# Patient Record
Sex: MALE | Race: WHITE | NOT HISPANIC OR LATINO | Employment: FULL TIME | URBAN - METROPOLITAN AREA
[De-identification: names, ages, dates, MRNs, and addresses within clinical notes are randomized per-mention and may not be internally consistent; named-entity substitution may affect disease eponyms.]

---

## 2018-01-11 ENCOUNTER — ALLSCRIPTS OFFICE VISIT (OUTPATIENT)
Dept: OTHER | Facility: OTHER | Age: 33
End: 2018-01-11

## 2018-01-11 DIAGNOSIS — M79.609 PAIN IN EXTREMITY: ICD-10-CM

## 2018-01-11 DIAGNOSIS — R20.2 PARESTHESIA OF SKIN: ICD-10-CM

## 2018-01-12 NOTE — PROGRESS NOTES
Assessment   1  Paresthesia and pain of left extremity (729 5,782 0) (M79 609,R20 2)   2  Paresthesia of left lower extremity (782 0) (R20 2)    Plan   Paresthesia and pain of left extremity, Paresthesia of left lower extremity    · * MRI BRAIN WO CONTRAST; Status:Need Information - Financial Authorization; Requested [id number];    · * MRI CERVICAL SPINE WO CONTRAST; Status:Need Information - Financial    Authorization; Requested [id number];    · * XR SPINE CERVICAL COMPLETE 4 OR 5 VW NON INJURY; Status:Active; Requested    AJP:[id number]; Discussion/Summary      DISCUSSED FINDINGS WITH LAURA SINCE SYMPTOMS ARE BECOMING MORE FREQ, FURTHER W/U INCLUDING MRI AND POSSIBLE BW SHOULD BE CONSIDERED AT THIS TIME  The treatment plan was reviewed with the patient/guardian  The patient/guardian understands and agrees with the treatment plan      Chief Complaint   Patient is here today for left arm and left leg pain that happened 2 days ago, and started again yesterday for 2 5 hours, L Cpx/LPN      History of Present Illness   HPI: PATIENT C/O SHARP PAIN IN ARM AND LEG ON THE L SIDE SL TINGLING LASTS FOR SEVERAL SECONDS SEVERAL YEARS AGO HAS OCCURRED MULTIPLE TIMES RECENTLY   ANY HEADACHE, NUMBNESS NOT HAD ANY TRAUMA ANY RECENT FEVER, CHILLS, RASHES OR TICK BITES WELL MUSCLE WEAKNESS DETECTED          Review of Systems        Constitutional: no fever,-- no chills-- and-- not feeling tired  ENT: no sore throat-- and-- no nasal discharge  Cardiovascular: no chest pain,-- the heart rate was not fast,-- no palpitations-- and-- no lower extremity edema  Respiratory: no cough,-- no wheezing-- and-- no shortness of breath during exertion  Gastrointestinal: no abdominal pain,-- no nausea,-- no vomiting-- and-- no diarrhea  Musculoskeletal: no arthralgias  Integumentary: no itching  Neurological: tingling, but-- no headache  ROS reviewed  Past Medical History   1   History of Achilles tendinitis of right lower extremity (726 71) (M76 61)   2  History of Displacement of lumbar intervertebral disc without myelopathy (722 10)     (M51 26)   3  History of hemorrhoids (V13 89) (Z87 19)  Active Problems And Past Medical History Reviewed: The active problems and past medical history were reviewed and updated today  Family History   Mother    1  Family history of Hypertension  Father    2  Family history of Hypertension  Other    3  Family history of Anxiety  Family History    4  Family history of Anxiety   5  Denied: Family history of substance abuse  Family History Reviewed: The family history was reviewed and updated today  Social History    · Caffeine use (V49 89) (F15 90)   · Dental care, regularly   · Drinks coffee   · Never a smoker   · Occasional alcohol use  The social history was reviewed and updated today  Surgical History   1  History of Dental Surgery  Surgical History Reviewed: The surgical history was reviewed and updated today  Current Meds      The medication list was reviewed and updated today  Allergies   1  Penicillins    Vitals    Recorded: [id number] 11:06AM   Temperature 98 3 F, Temporal   Heart Rate 82, R Radial   Pulse Quality Normal, R Radial   Respiration Quality Normal   Respiration 16   Systolic 935, RUE, Sitting   Diastolic 80, RUE, Sitting   Height 5 ft 8 in   Weight 169 lb    BMI Calculated 25 7   BSA Calculated 1 9   O2 Saturation 98     Physical Exam        Constitutional      General appearance: No acute distress, well appearing and well nourished  Eyes      Conjunctiva and lids: No swelling, erythema, or discharge  Pupils and irises: Equal, round and reactive to light  Ears, Nose, Mouth, and Throat      External inspection of ears and nose: Normal        Oropharynx: Normal with no erythema, edema, exudate or lesions         Pulmonary      Respiratory effort: No increased work of breathing or signs of respiratory distress  Auscultation of lungs: Clear to auscultation, equal breath sounds bilaterally, no wheezes, no rales, no rhonci  Cardiovascular      Auscultation of heart: Normal rate and rhythm, normal S1 and S2, without murmurs  Examination of extremities for edema and/or varicosities: Normal        Abdomen      Abdomen: Non-tender, no masses  Liver and spleen: No hepatomegaly or splenomegaly  Lymphatic      Palpation of lymph nodes in neck: No lymphadenopathy  Musculoskeletal      Gait and station: Normal        Digits and nails: Normal without clubbing or cyanosis  Skin      Skin and subcutaneous tissue: Normal without rashes or lesions  Neurologic      Cranial nerves: Cranial nerves 2-12 intact  Reflexes: 2+ and symmetric  Sensation: No sensory loss         Psychiatric      Orientation to person, place and time: Normal        Mood and affect: Normal           Signatures    Electronically signed by : Renzo Blair MD; Jan 11 2018  2:22PM EST                       (Author)

## 2018-01-18 NOTE — PROGRESS NOTES
Chief Complaint  Patient is here today for an Adacel Vaccine  Given to left deltoid I M  lb/lpn      Active Problems   1  Achilles tendinitis of right lower extremity (396 71) (M76 61)  2  Hemorrhoids, unspecified hemorrhoid type (455 6) (K64 9)    Current Meds  1  PredniSONE 20 MG Oral Tablet; TAKE 2 TABLET Daily X 4 DAYS, THEN 1 TAB QD X 4   DAYS, THEN 1/2  TAB PO QD X 4 DAYS; Therapy: [id number] to (Bethany Mcdonough)  Requested for: [id number]; Last   Rx:[id number] Ordered    Allergies   1  Penicillins    Plan  Achilles tendinitis of right lower extremity    · Orthopedic Surgery Referral Other Physician Referral  Consult  Status: Active  Requested  for: [id number]  YOU are Referring to a non- Preferred Provider : Established Patient  (MU) Care Summary provided  : Yes  Need for Tdap vaccination    · Administered:  Tdap (Adacel)    Signatures   Electronically signed by : Renzo Blair MD; May 29 2016 10:18PM EST                       (Author)

## 2018-01-23 VITALS
RESPIRATION RATE: 16 BRPM | SYSTOLIC BLOOD PRESSURE: 122 MMHG | HEART RATE: 82 BPM | OXYGEN SATURATION: 98 % | DIASTOLIC BLOOD PRESSURE: 80 MMHG | BODY MASS INDEX: 25.61 KG/M2 | WEIGHT: 169 LBS | TEMPERATURE: 98.3 F | HEIGHT: 68 IN

## 2018-04-16 ENCOUNTER — OFFICE VISIT (OUTPATIENT)
Dept: FAMILY MEDICINE CLINIC | Facility: CLINIC | Age: 33
End: 2018-04-16
Payer: COMMERCIAL

## 2018-04-16 VITALS — BODY MASS INDEX: 27.15 KG/M2 | TEMPERATURE: 98.5 F | HEIGHT: 67 IN | WEIGHT: 173 LBS

## 2018-04-16 DIAGNOSIS — K52.9 GASTROENTERITIS: Primary | ICD-10-CM

## 2018-04-16 DIAGNOSIS — B34.9 VIRAL INFECTION: ICD-10-CM

## 2018-04-16 PROBLEM — R20.2 PARESTHESIA OF LEFT LOWER EXTREMITY: Status: ACTIVE | Noted: 2018-01-11

## 2018-04-16 PROBLEM — M79.609 PARESTHESIA AND PAIN OF LEFT EXTREMITY: Status: ACTIVE | Noted: 2018-01-11

## 2018-04-16 PROBLEM — R20.2 PARESTHESIA AND PAIN OF LEFT EXTREMITY: Status: ACTIVE | Noted: 2018-01-11

## 2018-04-16 PROCEDURE — 3008F BODY MASS INDEX DOCD: CPT | Performed by: FAMILY MEDICINE

## 2018-04-16 PROCEDURE — 99213 OFFICE O/P EST LOW 20 MIN: CPT | Performed by: FAMILY MEDICINE

## 2018-04-16 NOTE — PROGRESS NOTES
Assessment/Plan:    Problem List Items Addressed This Visit     Gastroenteritis - Primary    Viral infection          Patient Instructions   PLENTY OF FLUIDS  BLAND DIET  AVOID DAIRY PRODUCTS FOR THE NEXT WEEK  IF SYMPTOMS PERSIST - CALL FOR FURTHER INSTRUCTIONS  RV PRN      Return in about 1 week (around 4/23/2018), or if symptoms worsen or fail to improve  Subjective:      Patient ID: Cody Ruiz is a 33 y o  male  Chief Complaint   Patient presents with    Fever       Diarrhea    This is a new problem  The current episode started in the past 7 days  The problem occurs 2 to 4 times per day  The problem has been gradually improving  The patient states that diarrhea does not awaken him from sleep  Associated symptoms include bloating, chills, a fever, headaches and myalgias  Pertinent negatives include no abdominal pain, arthralgias, coughing, increased  flatus, sweats, URI, vomiting or weight loss  Nothing aggravates the symptoms  Risk factors include ill contacts  He has tried increased fluids for the symptoms  The treatment provided mild relief  There is no history of bowel resection, inflammatory bowel disease, irritable bowel syndrome, malabsorption, a recent abdominal surgery or short gut syndrome  The following portions of the patient's history were reviewed and updated as appropriate: allergies, current medications, past family history, past medical history, past social history, past surgical history and problem list     Review of Systems   Constitutional: Positive for chills and fever  Negative for fatigue and weight loss  HENT: Negative for sore throat  Eyes: Negative for discharge  Respiratory: Negative for cough and chest tightness  Cardiovascular: Negative for chest pain and palpitations  Gastrointestinal: Positive for bloating and diarrhea  Negative for abdominal pain, flatus, nausea and vomiting  Musculoskeletal: Positive for myalgias   Negative for arthralgias and gait problem  Neurological: Positive for headaches  Negative for dizziness and weakness  Hematological: Negative for adenopathy  Psychiatric/Behavioral: The patient is not nervous/anxious  No current outpatient prescriptions on file  No current facility-administered medications for this visit  Objective:    Temp 98 5 °F (36 9 °C) (Temporal)   Ht 5' 7" (1 702 m)   Wt 78 5 kg (173 lb)   BMI 27 10 kg/m²        Physical Exam   Constitutional: He is oriented to person, place, and time  He appears well-developed and well-nourished  HENT:   Head: Normocephalic and atraumatic  Eyes: Conjunctivae and EOM are normal  Pupils are equal, round, and reactive to light  Right eye exhibits no discharge  Left eye exhibits no discharge  Neck: Neck supple  No JVD present  No thyromegaly present  Cardiovascular: Normal rate, regular rhythm and normal heart sounds  No murmur heard  Pulmonary/Chest: Effort normal and breath sounds normal  He has no wheezes  He has no rales  Abdominal: Soft  Bowel sounds are normal  He exhibits no distension and no mass  There is no hepatosplenomegaly  There is no tenderness  There is no rebound, no guarding and no CVA tenderness  Musculoskeletal: Normal range of motion  He exhibits no edema, tenderness or deformity  Lymphadenopathy:     He has no cervical adenopathy  He has no axillary adenopathy  Neurological: He is alert and oriented to person, place, and time  Skin: Skin is warm and dry  No rash noted  No erythema  Psychiatric: He has a normal mood and affect   His behavior is normal  Judgment and thought content normal               Maira Rojas MD

## 2018-04-16 NOTE — PATIENT INSTRUCTIONS
PLENTY OF FLUIDS  BLAND DIET  AVOID DAIRY PRODUCTS FOR THE NEXT WEEK  IF SYMPTOMS PERSIST - CALL FOR FURTHER INSTRUCTIONS  RV PRN

## 2018-10-24 ENCOUNTER — OFFICE VISIT (OUTPATIENT)
Dept: FAMILY MEDICINE CLINIC | Facility: CLINIC | Age: 33
End: 2018-10-24
Payer: COMMERCIAL

## 2018-10-24 VITALS
HEART RATE: 78 BPM | DIASTOLIC BLOOD PRESSURE: 80 MMHG | SYSTOLIC BLOOD PRESSURE: 124 MMHG | BODY MASS INDEX: 25.46 KG/M2 | HEIGHT: 68 IN | WEIGHT: 168 LBS | TEMPERATURE: 97.9 F | RESPIRATION RATE: 16 BRPM | OXYGEN SATURATION: 98 %

## 2018-10-24 DIAGNOSIS — Z23 NEED FOR INFLUENZA VACCINATION: ICD-10-CM

## 2018-10-24 DIAGNOSIS — K64.9 HEMORRHOIDS, UNSPECIFIED HEMORRHOID TYPE: Primary | ICD-10-CM

## 2018-10-24 PROCEDURE — 1036F TOBACCO NON-USER: CPT | Performed by: FAMILY MEDICINE

## 2018-10-24 PROCEDURE — 3008F BODY MASS INDEX DOCD: CPT | Performed by: FAMILY MEDICINE

## 2018-10-24 PROCEDURE — 99213 OFFICE O/P EST LOW 20 MIN: CPT | Performed by: FAMILY MEDICINE

## 2018-10-24 PROCEDURE — 3725F SCREEN DEPRESSION PERFORMED: CPT | Performed by: FAMILY MEDICINE

## 2018-10-24 NOTE — PROGRESS NOTES
Assessment/Plan:    Problem List Items Addressed This Visit        Digestive    Hemorrhoids - Primary    Relevant Medications    hydrocortisone (ANUSOL-HC, PROCTOSOL HC,) 2 5 % rectal cream       Other    Need for influenza vaccination    Relevant Orders    influenza vaccine, [id number], quadrivalent, recombinant, PF, 0 5 mL, for patients 18-49 yr with comorbidities ([facility])          Patient Instructions   HYDRATION  INCREASE FIBER  STOOL SOFTENER  TRIAL OF ANUSOL HC    RV PRN      Return if symptoms worsen or fail to improve  Subjective:      Patient ID: Keke Swanson is a 29 y o  male  Chief Complaint   Patient presents with    discussion    Flu Vaccine     refused       PATIENT HAS HAS RECURRENT ? HEMORRHOIDS  ITCHY  CONCERNED  NO BLEEDING        The following portions of the patient's history were reviewed and updated as appropriate: allergies, current medications, past family history, past medical history, past social history, past surgical history and problem list     Review of Systems   Constitutional: Negative for chills, fatigue and fever  HENT: Negative for sore throat  Eyes: Negative for discharge  Respiratory: Negative for cough and chest tightness  Cardiovascular: Negative for chest pain and palpitations  Gastrointestinal: Negative for abdominal pain, diarrhea, nausea and vomiting  Musculoskeletal: Negative for arthralgias and gait problem  Neurological: Negative for dizziness, weakness and headaches  Hematological: Negative for adenopathy  Psychiatric/Behavioral: The patient is not nervous/anxious  Current Outpatient Prescriptions   Medication Sig Dispense Refill    hydrocortisone (ANUSOL-HC, PROCTOSOL HC,) 2 5 % rectal cream Insert into the rectum daily at bedtime 30 g 3     No current facility-administered medications for this visit          Objective:    /80   Pulse 78   Temp 97 9 °F (36 6 °C) (Temporal)   Resp 16   Ht 5' 7 5" (1 715 m)   Wt 76 2 kg (168 lb)   SpO2 98%   BMI 25 92 kg/m²        Physical Exam   Constitutional: He is oriented to person, place, and time  He appears well-developed and well-nourished  HENT:   Head: Normocephalic and atraumatic  Eyes: Pupils are equal, round, and reactive to light  Conjunctivae and EOM are normal  Right eye exhibits no discharge  Left eye exhibits no discharge  Neck: Neck supple  No JVD present  No thyromegaly present  Cardiovascular: Normal rate, regular rhythm and normal heart sounds  No murmur heard  Pulmonary/Chest: Effort normal and breath sounds normal  He has no wheezes  He has no rales  Abdominal: Soft  Bowel sounds are normal  He exhibits no mass  There is no hepatosplenomegaly  There is no tenderness  There is no rebound, no guarding and no CVA tenderness  RECTAL REGION  MILDLY ERYTHEMATOUS  SEVERAL SMALL EXT HEM  NO BLEEDING     Musculoskeletal: Normal range of motion  He exhibits no edema, tenderness or deformity  Lymphadenopathy:     He has no cervical adenopathy  He has no axillary adenopathy  Neurological: He is alert and oriented to person, place, and time  Skin: Skin is warm and dry  No rash noted  No erythema  Psychiatric: He has a normal mood and affect   His behavior is normal  Judgment and thought content normal               Marybeth Bunn MD

## 2019-05-23 ENCOUNTER — OFFICE VISIT (OUTPATIENT)
Dept: FAMILY MEDICINE CLINIC | Facility: CLINIC | Age: 34
End: 2019-05-23
Payer: COMMERCIAL

## 2019-05-23 VITALS
DIASTOLIC BLOOD PRESSURE: 70 MMHG | TEMPERATURE: 97.8 F | RESPIRATION RATE: 16 BRPM | SYSTOLIC BLOOD PRESSURE: 128 MMHG | HEIGHT: 68 IN | OXYGEN SATURATION: 98 % | HEART RATE: 78 BPM | BODY MASS INDEX: 27.43 KG/M2 | WEIGHT: 181 LBS

## 2019-05-23 DIAGNOSIS — R42 VERTIGO: Primary | ICD-10-CM

## 2019-05-23 DIAGNOSIS — H83.2X9: ICD-10-CM

## 2019-05-23 DIAGNOSIS — H83.03 LABYRINTHITIS OF BOTH EARS: ICD-10-CM

## 2019-05-23 PROBLEM — C44.92 SQUAMOUS CELL CARCINOMA OF SKIN: Status: ACTIVE | Noted: 2019-04-16

## 2019-05-23 PROCEDURE — 99213 OFFICE O/P EST LOW 20 MIN: CPT | Performed by: FAMILY MEDICINE

## 2019-05-23 PROCEDURE — 3008F BODY MASS INDEX DOCD: CPT | Performed by: FAMILY MEDICINE

## 2019-05-23 RX ORDER — MECLIZINE HYDROCHLORIDE 25 MG/1
TABLET ORAL EVERY 6 HOURS
COMMUNITY
End: 2019-05-23 | Stop reason: ALTCHOICE

## 2019-05-27 PROBLEM — H83.03 LABYRINTHITIS OF BOTH EARS: Status: ACTIVE | Noted: 2019-05-27

## 2019-05-27 PROBLEM — M79.609 PARESTHESIA AND PAIN OF LEFT EXTREMITY: Status: RESOLVED | Noted: 2018-01-11 | Resolved: 2019-05-27

## 2019-05-27 PROBLEM — K52.9 GASTROENTERITIS: Status: RESOLVED | Noted: 2018-04-16 | Resolved: 2019-05-27

## 2019-05-27 PROBLEM — R20.2 PARESTHESIA OF LEFT LOWER EXTREMITY: Status: RESOLVED | Noted: 2018-01-11 | Resolved: 2019-05-27

## 2019-05-27 PROBLEM — Z23 NEED FOR INFLUENZA VACCINATION: Status: RESOLVED | Noted: 2018-10-24 | Resolved: 2019-05-27

## 2019-05-27 PROBLEM — K64.9 HEMORRHOIDS: Status: RESOLVED | Noted: 2018-10-24 | Resolved: 2019-05-27

## 2019-05-27 PROBLEM — H83.2X9: Status: RESOLVED | Noted: 2019-05-23 | Resolved: 2019-05-27

## 2019-05-27 PROBLEM — B34.9 VIRAL INFECTION: Status: RESOLVED | Noted: 2018-04-16 | Resolved: 2019-05-27

## 2019-05-27 PROBLEM — R20.2 PARESTHESIA AND PAIN OF LEFT EXTREMITY: Status: RESOLVED | Noted: 2018-01-11 | Resolved: 2019-05-27

## 2020-07-23 ENCOUNTER — OFFICE VISIT (OUTPATIENT)
Dept: FAMILY MEDICINE CLINIC | Facility: CLINIC | Age: 35
End: 2020-07-23
Payer: COMMERCIAL

## 2020-07-23 VITALS
OXYGEN SATURATION: 97 % | TEMPERATURE: 99.3 F | RESPIRATION RATE: 16 BRPM | HEART RATE: 79 BPM | BODY MASS INDEX: 28.07 KG/M2 | SYSTOLIC BLOOD PRESSURE: 110 MMHG | DIASTOLIC BLOOD PRESSURE: 72 MMHG | HEIGHT: 68 IN | WEIGHT: 185.2 LBS

## 2020-07-23 DIAGNOSIS — R21 RASH: Primary | ICD-10-CM

## 2020-07-23 DIAGNOSIS — J12.81 SARS (SEVERE ACUTE RESPIRATORY SYNDROME): ICD-10-CM

## 2020-07-23 DIAGNOSIS — Z20.828 SARS-ASSOCIATED CORONAVIRUS EXPOSURE: ICD-10-CM

## 2020-07-23 PROCEDURE — 1036F TOBACCO NON-USER: CPT | Performed by: FAMILY MEDICINE

## 2020-07-23 PROCEDURE — 99213 OFFICE O/P EST LOW 20 MIN: CPT | Performed by: FAMILY MEDICINE

## 2020-07-23 PROCEDURE — 3008F BODY MASS INDEX DOCD: CPT | Performed by: FAMILY MEDICINE

## 2020-07-25 PROBLEM — J12.81 SARS (SEVERE ACUTE RESPIRATORY SYNDROME): Status: ACTIVE | Noted: 2020-07-25

## 2020-07-25 PROBLEM — R21 RASH: Status: ACTIVE | Noted: 2020-07-25

## 2020-07-25 PROBLEM — Z20.828 SARS-ASSOCIATED CORONAVIRUS EXPOSURE: Status: ACTIVE | Noted: 2020-07-25

## 2020-07-25 NOTE — PROGRESS NOTES
BMI Counseling: Body mass index is 28 16 kg/m²  The BMI is above normal  Nutrition recommendations include encouraging healthy choices of fruits and vegetables and moderation in carbohydrate intake  Exercise recommendations include exercising 3-5 times per week  No pharmacotherapy was ordered  Assessment/Plan:    No problem-specific Assessment & Plan notes found for this encounter  Diagnoses and all orders for this visit:    Rash    SARS (severe acute respiratory syndrome)  -     SARS CoV-2 ANTIBODY,IgG; Future  -     Cancel: SARS CoV-2 ANTIBODY,IgM; Future    SARS-associated coronavirus exposure  -     SARS CoV-2 ANTIBODY,IgG; Future  -     SARS CoV-2 ANTIBODY,IgM; Future          Patient Instructions   CONTINUE CURRENT TREATMENT PLAN  BW WILL BE OBTAINED  MONITOR SYMPTOSMS      Return for Next scheduled follow up  Subjective:      Patient ID: Earnest Ramírez is a 30 y o  male  Chief Complaint   Patient presents with    Urticaria     7/17/20 seen at [address] [place] [address] remail  Also Covid-19  throat swab - NEG    COVID-19     would like to discuss Antibody tests - had Sore Throat & Headache same time as rash - pt is an EMT & teacher,        PATIENT IS CONCERNED ABOUT A RECENT RASH  HIVE LIKE  HAD A SL ST  NO FEVER  FADING NOW  WAS NOT ITCHY OR PAINFUL    HEALTHCARE WORKER  ? EXPOSURE TO COVID POSITIVE PATIENTS  RECENT PCR TEST WAS NEG  WOULD LIKE COVID-19 SEROLOGY      The following portions of the patient's history were reviewed and updated as appropriate: allergies, current medications, past family history, past medical history, past social history, past surgical history and problem list     Review of Systems   Constitutional: Negative for chills, fatigue and fever  HENT: Negative for congestion, ear discharge, ear pain, mouth sores, postnasal drip, sore throat and trouble swallowing  Eyes: Negative for pain, discharge and visual disturbance     Respiratory: Negative for cough, shortness of breath and wheezing  Cardiovascular: Negative for chest pain, palpitations and leg swelling  Gastrointestinal: Negative for abdominal distention, abdominal pain, blood in stool, diarrhea and nausea  Endocrine: Negative for polydipsia, polyphagia and polyuria  Genitourinary: Negative for dysuria, frequency, hematuria and urgency  Musculoskeletal: Negative for arthralgias, gait problem and joint swelling  Skin: Positive for rash  Negative for pallor  Neurological: Negative for dizziness, syncope, speech difficulty, weakness, light-headedness, numbness and headaches  Hematological: Negative for adenopathy  Psychiatric/Behavioral: Negative for behavioral problems, confusion and sleep disturbance  The patient is not nervous/anxious  Current Outpatient Medications   Medication Sig Dispense Refill    hydrocortisone (ANUSOL-HC, PROCTOSOL HC,) 2 5 % rectal cream Insert into the rectum daily at bedtime (Patient taking differently: Insert into the rectum daily as needed ) 30 g 3     No current facility-administered medications for this visit  Objective:    /72   Pulse 79   Temp 99 3 °F (37 4 °C) (Temporal)   Resp 16   Ht 5' 8" (1 727 m)   Wt 84 kg (185 lb 3 2 oz)   SpO2 97%   BMI 28 16 kg/m²        Physical Exam   Constitutional: He is oriented to person, place, and time  He appears well-developed and well-nourished  HENT:   Head: Normocephalic and atraumatic  Eyes: Pupils are equal, round, and reactive to light  Conjunctivae and EOM are normal  Right eye exhibits no discharge  Left eye exhibits no discharge  Neck: Neck supple  No JVD present  No thyromegaly present  Cardiovascular: Normal rate, regular rhythm and normal heart sounds  No murmur heard  Pulmonary/Chest: Effort normal and breath sounds normal  He has no wheezes  He has no rales  Abdominal: Soft  Bowel sounds are normal  He exhibits no mass   There is no hepatosplenomegaly  There is no tenderness  There is no rebound, no guarding and no CVA tenderness  Musculoskeletal: Normal range of motion  He exhibits no edema, tenderness or deformity  Lymphadenopathy:     He has no cervical adenopathy  He has no axillary adenopathy  Neurological: He is alert and oriented to person, place, and time  Skin: Skin is warm and dry  No rash noted  No erythema  MILD ECZEMA RASH ON BACK   Psychiatric: He has a normal mood and affect   His behavior is normal  Judgment and thought content normal               Danette Sharma MD

## 2021-11-09 ENCOUNTER — IMMUNIZATIONS (OUTPATIENT)
Dept: FAMILY MEDICINE CLINIC | Facility: HOSPITAL | Age: 36
End: 2021-11-09

## 2021-11-09 DIAGNOSIS — Z23 ENCOUNTER FOR IMMUNIZATION: Primary | ICD-10-CM

## 2021-11-09 PROCEDURE — 0012A COVID-19 MODERNA VACC 0.5 ML: CPT

## 2021-11-09 PROCEDURE — 91301 COVID-19 MODERNA VACC 0.5 ML: CPT

## 2022-06-28 ENCOUNTER — OFFICE VISIT (OUTPATIENT)
Dept: FAMILY MEDICINE CLINIC | Facility: CLINIC | Age: 37
End: 2022-06-28
Payer: COMMERCIAL

## 2022-06-28 VITALS
HEIGHT: 67 IN | WEIGHT: 187 LBS | HEART RATE: 87 BPM | TEMPERATURE: 97.9 F | DIASTOLIC BLOOD PRESSURE: 80 MMHG | RESPIRATION RATE: 12 BRPM | SYSTOLIC BLOOD PRESSURE: 112 MMHG | OXYGEN SATURATION: 98 % | BODY MASS INDEX: 29.35 KG/M2

## 2022-06-28 DIAGNOSIS — R21 RASH AND NONSPECIFIC SKIN ERUPTION: Primary | ICD-10-CM

## 2022-06-28 PROCEDURE — 99213 OFFICE O/P EST LOW 20 MIN: CPT | Performed by: NURSE PRACTITIONER

## 2022-06-28 PROCEDURE — 3008F BODY MASS INDEX DOCD: CPT | Performed by: NURSE PRACTITIONER

## 2022-06-28 PROCEDURE — 1036F TOBACCO NON-USER: CPT | Performed by: NURSE PRACTITIONER

## 2022-06-29 NOTE — PROGRESS NOTES
Assessment/Plan:    1  Rash and nonspecific skin eruption  -     CBC and differential; Future  -     Coagulation Profile; Future  -     CBC and differential         Return in about 1 week (around 7/5/2022), or if symptoms worsen or fail to improve  Subjective:      Patient ID: Elana Burgos is a 29 y o  male  Chief Complaint   Patient presents with    Rash     Pt here for rash on his back, not itcy, reddish and flat, noticed it today       Rash  This is a new problem  The current episode started in the past 7 days  The problem is unchanged  The affected locations include the back  The problem is mild  The rash is characterized by redness  He was exposed to nothing  Pertinent negatives include no fatigue, fever or shortness of breath  Past treatments include nothing  The treatment provided no relief  The following portions of the patient's history were reviewed and updated as appropriate: allergies, current medications, past family history, past medical history, past social history, past surgical history and problem list     Review of Systems   Constitutional: Negative for fatigue and fever  Respiratory: Negative for shortness of breath  Cardiovascular: Negative for chest pain  Skin: Positive for rash  Current Outpatient Medications   Medication Sig Dispense Refill    Sunscreens (Total Block SPF 60 Cover Up) LOTN Total Block Cover Up SPF 60 lotion   Apply by topical route every 2 hours outdoors/after swimming      hydrocortisone (ANUSOL-HC, PROCTOSOL HC,) 2 5 % rectal cream Insert into the rectum daily at bedtime (Patient not taking: Reported on 6/28/2022) 30 g 3     No current facility-administered medications for this visit  Objective:    /80   Pulse 87   Temp 97 9 °F (36 6 °C) (Temporal)   Resp 12   Ht 5' 7" (1 702 m)   Wt 84 8 kg (187 lb)   SpO2 98%   BMI 29 29 kg/m²        Physical Exam  Vitals reviewed     Constitutional:       General: He is not in acute distress  Appearance: He is well-developed  He is not diaphoretic  HENT:      Head: Normocephalic and atraumatic  Eyes:      General:         Right eye: No discharge  Left eye: No discharge  Conjunctiva/sclera: Conjunctivae normal    Neck:      Thyroid: No thyromegaly  Cardiovascular:      Rate and Rhythm: Normal rate and regular rhythm  Heart sounds: Normal heart sounds  Pulmonary:      Effort: Pulmonary effort is normal  No respiratory distress  Breath sounds: Normal breath sounds  No decreased breath sounds, wheezing, rhonchi or rales  Musculoskeletal:      Cervical back: Normal range of motion and neck supple  Lymphadenopathy:      Cervical: No cervical adenopathy  Skin:     General: Skin is warm and dry  Findings: Rash present  Comments: Erythematous macular rash that extends across the back    Neurological:      Mental Status: He is alert and oriented to person, place, and time  Psychiatric:         Behavior: Behavior normal          Thought Content:  Thought content normal          Judgment: Judgment normal                 DONTE Silver

## 2024-02-21 PROBLEM — J12.81 SARS (SEVERE ACUTE RESPIRATORY SYNDROME): Status: RESOLVED | Noted: 2020-07-25 | Resolved: 2024-02-21

## 2024-10-03 DIAGNOSIS — Z13.29 SCREENING FOR THYROID DISORDER: ICD-10-CM

## 2024-10-03 DIAGNOSIS — Z00.00 ROUTINE GENERAL MEDICAL EXAMINATION AT A HEALTH CARE FACILITY: ICD-10-CM

## 2024-10-03 DIAGNOSIS — Z11.59 NEED FOR HEPATITIS C SCREENING TEST: ICD-10-CM

## 2024-10-03 DIAGNOSIS — Z11.4 SCREENING FOR HIV (HUMAN IMMUNODEFICIENCY VIRUS): Primary | ICD-10-CM

## 2024-10-03 DIAGNOSIS — Z13.220 SCREENING FOR LIPID DISORDERS: ICD-10-CM

## 2024-10-03 DIAGNOSIS — Z13.6 SCREENING FOR HYPERTENSION: ICD-10-CM

## 2024-10-18 LAB
BASOPHILS # BLD AUTO: 0 X10E3/UL (ref 0–0.2)
BASOPHILS NFR BLD AUTO: 1 %
EOSINOPHIL # BLD AUTO: 0.1 X10E3/UL (ref 0–0.4)
EOSINOPHIL NFR BLD AUTO: 2 %
ERYTHROCYTE [DISTWIDTH] IN BLOOD BY AUTOMATED COUNT: 12.2 % (ref 11.6–15.4)
HCT VFR BLD AUTO: 49.3 % (ref 37.5–51)
HGB BLD-MCNC: 17.1 G/DL (ref 13–17.7)
IMM GRANULOCYTES # BLD: 0 X10E3/UL (ref 0–0.1)
IMM GRANULOCYTES NFR BLD: 0 %
LYMPHOCYTES # BLD AUTO: 1.7 X10E3/UL (ref 0.7–3.1)
LYMPHOCYTES NFR BLD AUTO: 33 %
MCH RBC QN AUTO: 30.6 PG (ref 26.6–33)
MCHC RBC AUTO-ENTMCNC: 34.7 G/DL (ref 31.5–35.7)
MCV RBC AUTO: 88 FL (ref 79–97)
MONOCYTES # BLD AUTO: 0.4 X10E3/UL (ref 0.1–0.9)
MONOCYTES NFR BLD AUTO: 8 %
NEUTROPHILS # BLD AUTO: 2.8 X10E3/UL (ref 1.4–7)
NEUTROPHILS NFR BLD AUTO: 56 %
PLATELET # BLD AUTO: 232 X10E3/UL (ref 150–450)
RBC # BLD AUTO: 5.58 X10E6/UL (ref 4.14–5.8)
WBC # BLD AUTO: 5 X10E3/UL (ref 3.4–10.8)

## 2024-10-19 LAB
ALBUMIN SERPL-MCNC: 5.1 G/DL (ref 4.1–5.1)
ALP SERPL-CCNC: 84 IU/L (ref 44–121)
ALT SERPL-CCNC: 17 IU/L (ref 0–44)
AST SERPL-CCNC: 22 IU/L (ref 0–40)
BILIRUB SERPL-MCNC: 2.4 MG/DL (ref 0–1.2)
BUN SERPL-MCNC: 13 MG/DL (ref 6–20)
BUN/CREAT SERPL: 14 (ref 9–20)
CALCIUM SERPL-MCNC: 9.9 MG/DL (ref 8.7–10.2)
CHLORIDE SERPL-SCNC: 103 MMOL/L (ref 96–106)
CHOLEST SERPL-MCNC: 184 MG/DL (ref 100–199)
CHOLEST/HDLC SERPL: 4.4 RATIO (ref 0–5)
CO2 SERPL-SCNC: 24 MMOL/L (ref 20–29)
CREAT SERPL-MCNC: 0.96 MG/DL (ref 0.76–1.27)
EGFR: 104 ML/MIN/1.73
GLOBULIN SER-MCNC: 2.2 G/DL (ref 1.5–4.5)
GLUCOSE SERPL-MCNC: 99 MG/DL (ref 70–99)
HCV AB S/CO SERPL IA: NON REACTIVE
HDLC SERPL-MCNC: 42 MG/DL
HIV 1+2 AB+HIV1 P24 AG SERPL QL IA: NON REACTIVE
LDLC SERPL CALC-MCNC: 125 MG/DL (ref 0–99)
POTASSIUM SERPL-SCNC: 5 MMOL/L (ref 3.5–5.2)
PROT SERPL-MCNC: 7.3 G/DL (ref 6–8.5)
SL AMB VLDL CHOLESTEROL CALC: 17 MG/DL (ref 5–40)
SODIUM SERPL-SCNC: 142 MMOL/L (ref 134–144)
TRIGL SERPL-MCNC: 95 MG/DL (ref 0–149)
TSH SERPL DL<=0.005 MIU/L-ACNC: 2.42 UIU/ML (ref 0.45–4.5)

## 2024-10-23 PROBLEM — R21 RASH: Status: RESOLVED | Noted: 2020-07-25 | Resolved: 2024-10-23

## 2024-10-23 PROBLEM — D36.12 NEUROMA OF HAND: Status: ACTIVE | Noted: 2024-05-10

## 2024-10-23 PROBLEM — R42 VERTIGO: Status: RESOLVED | Noted: 2019-05-23 | Resolved: 2024-10-23

## 2024-10-23 PROBLEM — Z20.828 SARS-ASSOCIATED CORONAVIRUS EXPOSURE: Status: RESOLVED | Noted: 2020-07-25 | Resolved: 2024-10-23

## 2024-10-23 PROBLEM — H83.03 LABYRINTHITIS OF BOTH EARS: Status: RESOLVED | Noted: 2019-05-27 | Resolved: 2024-10-23

## 2024-10-23 PROBLEM — C44.92 SQUAMOUS CELL CARCINOMA OF SKIN: Status: RESOLVED | Noted: 2019-04-16 | Resolved: 2024-10-23

## 2024-10-23 NOTE — PATIENT INSTRUCTIONS
DISCUSSED HEALTH MAINTENENCE ISSUES  BW WILL BE REVIEWED  ENCOURAGED HEALTHY DIET AND EXERCISE  COLONOSCOPY WILL BE ORDERED  RV FOR ANNUAL HEALTH EXAM IN 1 YEAR  RV SOONER IF THERE ARE ANY CONCERNS      Recent Results (from the past 672 hour(s))   CBC and differential    Collection Time: 10/18/24  7:50 AM   Result Value Ref Range    White Blood Cell Count 5.0 3.4 - 10.8 x10E3/uL    Red Blood Cell Count 5.58 4.14 - 5.80 x10E6/uL    Hemoglobin 17.1 13.0 - 17.7 g/dL    HCT 49.3 37.5 - 51.0 %    MCV 88 79 - 97 fL    MCH 30.6 26.6 - 33.0 pg    MCHC 34.7 31.5 - 35.7 g/dL    RDW 12.2 11.6 - 15.4 %    Platelet Count 232 150 - 450 x10E3/uL    Neutrophils 56 Not Estab. %    Lymphocytes 33 Not Estab. %    Monocytes 8 Not Estab. %    Eosinophils 2 Not Estab. %    Basophils PCT 1 Not Estab. %    Neutrophils (Absolute) 2.8 1.4 - 7.0 x10E3/uL    Lymphocytes (Absolute) 1.7 0.7 - 3.1 x10E3/uL    Monocytes (Absolute) 0.4 0.1 - 0.9 x10E3/uL    Eosinophils (Absolute) 0.1 0.0 - 0.4 x10E3/uL    Basophils ABS 0.0 0.0 - 0.2 x10E3/uL    Immature Granulocytes 0 Not Estab. %    Immature Granulocytes (Absolute) 0.0 0.0 - 0.1 x10E3/uL   Comprehensive metabolic panel    Collection Time: 10/18/24  7:50 AM   Result Value Ref Range    Glucose, Random 99 70 - 99 mg/dL    BUN 13 6 - 20 mg/dL    Creatinine 0.96 0.76 - 1.27 mg/dL    eGFR 104 >59 mL/min/1.73    SL AMB BUN/CREATININE RATIO 14 9 - 20    Sodium 142 134 - 144 mmol/L    Potassium 5.0 3.5 - 5.2 mmol/L    Chloride 103 96 - 106 mmol/L    CO2 24 20 - 29 mmol/L    CALCIUM 9.9 8.7 - 10.2 mg/dL    Protein, Total 7.3 6.0 - 8.5 g/dL    Albumin 5.1 4.1 - 5.1 g/dL    Globulin, Total 2.2 1.5 - 4.5 g/dL    TOTAL BILIRUBIN 2.4 (H) 0.0 - 1.2 mg/dL    Alk Phos Isoenzymes 84 44 - 121 IU/L    AST 22 0 - 40 IU/L    ALT 17 0 - 44 IU/L   Hepatitis C antibody    Collection Time: 10/18/24  7:50 AM   Result Value Ref Range    HEP C AB Non Reactive Non Reactive   Human Immunodeficiency Virus 1/2 Antigen / Antibody (  Fourth Generation) with Reflex Testing    Collection Time: 10/18/24  7:50 AM   Result Value Ref Range    HIV Screen 4th Generation wRflx Non Reactive Non Reactive   Lipid panel    Collection Time: 10/18/24  7:50 AM   Result Value Ref Range    Cholesterol, Total 184 100 - 199 mg/dL    Triglycerides 95 0 - 149 mg/dL    HDL 42 >39 mg/dL    VLDL Cholesterol Calculated 17 5 - 40 mg/dL    LDL Calculated 125 (H) 0 - 99 mg/dL    T. Chol/HDL Ratio 4.4 0.0 - 5.0 ratio   TSH, 3rd generation    Collection Time: 10/18/24  7:50 AM   Result Value Ref Range    TSH 2.420 0.450 - 4.500 uIU/mL

## 2024-10-24 ENCOUNTER — OFFICE VISIT (OUTPATIENT)
Dept: FAMILY MEDICINE CLINIC | Facility: CLINIC | Age: 39
End: 2024-10-24
Payer: COMMERCIAL

## 2024-10-24 VITALS
HEART RATE: 70 BPM | TEMPERATURE: 98.3 F | SYSTOLIC BLOOD PRESSURE: 154 MMHG | DIASTOLIC BLOOD PRESSURE: 90 MMHG | RESPIRATION RATE: 16 BRPM | BODY MASS INDEX: 27.22 KG/M2 | HEIGHT: 68 IN | WEIGHT: 179.6 LBS | OXYGEN SATURATION: 97 %

## 2024-10-24 DIAGNOSIS — Z13.29 SCREENING FOR HYPOTHYROIDISM: ICD-10-CM

## 2024-10-24 DIAGNOSIS — Z00.00 ROUTINE GENERAL MEDICAL EXAMINATION AT A HEALTH CARE FACILITY: Primary | ICD-10-CM

## 2024-10-24 DIAGNOSIS — Z13.6 SCREENING FOR HYPERTENSION: ICD-10-CM

## 2024-10-24 DIAGNOSIS — Z13.220 SCREENING FOR HYPERLIPIDEMIA: ICD-10-CM

## 2024-10-24 PROBLEM — D36.12 NEUROMA OF HAND: Status: RESOLVED | Noted: 2024-05-10 | Resolved: 2024-10-24

## 2024-10-24 PROCEDURE — 99395 PREV VISIT EST AGE 18-39: CPT | Performed by: FAMILY MEDICINE

## 2024-10-24 NOTE — LETTER
LAURA LESTER      Current Outpatient Medications:     hydrocortisone (ANUSOL-HC, PROCTOSOL HC,) 2.5 % rectal cream, Insert into the rectum daily at bedtime (Patient not taking: Reported on 6/28/2022), Disp: 30 g, Rfl: 3    Sunscreens (Total Block SPF 60 Cover Up) LOTN, Total Block Cover Up SPF 60 lotion  Apply by topical route every 2 hours outdoors/after swimming, Disp: , Rfl:     Recent Results (from the past 672 hour(s))   CBC and differential    Collection Time: 10/18/24  7:50 AM   Result Value Ref Range    White Blood Cell Count 5.0 3.4 - 10.8 x10E3/uL    Red Blood Cell Count 5.58 4.14 - 5.80 x10E6/uL    Hemoglobin 17.1 13.0 - 17.7 g/dL    HCT 49.3 37.5 - 51.0 %    MCV 88 79 - 97 fL    MCH 30.6 26.6 - 33.0 pg    MCHC 34.7 31.5 - 35.7 g/dL    RDW 12.2 11.6 - 15.4 %    Platelet Count 232 150 - 450 x10E3/uL    Neutrophils 56 Not Estab. %    Lymphocytes 33 Not Estab. %    Monocytes 8 Not Estab. %    Eosinophils 2 Not Estab. %    Basophils PCT 1 Not Estab. %    Neutrophils (Absolute) 2.8 1.4 - 7.0 x10E3/uL    Lymphocytes (Absolute) 1.7 0.7 - 3.1 x10E3/uL    Monocytes (Absolute) 0.4 0.1 - 0.9 x10E3/uL    Eosinophils (Absolute) 0.1 0.0 - 0.4 x10E3/uL    Basophils ABS 0.0 0.0 - 0.2 x10E3/uL    Immature Granulocytes 0 Not Estab. %    Immature Granulocytes (Absolute) 0.0 0.0 - 0.1 x10E3/uL   Comprehensive metabolic panel    Collection Time: 10/18/24  7:50 AM   Result Value Ref Range    Glucose, Random 99 70 - 99 mg/dL    BUN 13 6 - 20 mg/dL    Creatinine 0.96 0.76 - 1.27 mg/dL    eGFR 104 >59 mL/min/1.73    SL AMB BUN/CREATININE RATIO 14 9 - 20    Sodium 142 134 - 144 mmol/L    Potassium 5.0 3.5 - 5.2 mmol/L    Chloride 103 96 - 106 mmol/L    CO2 24 20 - 29 mmol/L    CALCIUM 9.9 8.7 - 10.2 mg/dL    Protein, Total 7.3 6.0 - 8.5 g/dL    Albumin 5.1 4.1 - 5.1 g/dL    Globulin, Total 2.2 1.5 - 4.5 g/dL    TOTAL BILIRUBIN 2.4 (H) 0.0 - 1.2 mg/dL    Alk Phos Isoenzymes 84 44 - 121 IU/L    AST 22 0 - 40 IU/L    ALT 17 0  - 44 IU/L   Hepatitis C antibody    Collection Time: 10/18/24  7:50 AM   Result Value Ref Range    HEP C AB Non Reactive Non Reactive   Human Immunodeficiency Virus 1/2 Antigen / Antibody ( Fourth Generation) with Reflex Testing    Collection Time: 10/18/24  7:50 AM   Result Value Ref Range    HIV Screen 4th Generation wRflx Non Reactive Non Reactive   Lipid panel    Collection Time: 10/18/24  7:50 AM   Result Value Ref Range    Cholesterol, Total 184 100 - 199 mg/dL    Triglycerides 95 0 - 149 mg/dL    HDL 42 >39 mg/dL    VLDL Cholesterol Calculated 17 5 - 40 mg/dL    LDL Calculated 125 (H) 0 - 99 mg/dL    T. Chol/HDL Ratio 4.4 0.0 - 5.0 ratio   TSH, 3rd generation    Collection Time: 10/18/24  7:50 AM   Result Value Ref Range    TSH 2.420 0.450 - 4.500 uIU/mL

## 2024-10-24 NOTE — PROGRESS NOTES
Depression Screening and Follow-up Plan: Patient was screened for depression during today's encounter. They screened negative with a PHQ-2 score of 0.    FAMILY PRACTICE HEALTH MAINTENANCE OFFICE VISIT  [facility] - [facility]    NAME: Nick Mckee  AGE: 39 y.o. SEX: male  : 1985     DATE: 10/24/2024    Assessment and Plan     Problem List Items Addressed This Visit    None  Visit Diagnoses       Routine general medical examination at a health care facility    -  Primary    Screening for hypertension        Screening for hyperlipidemia        Screening for hypothyroidism                     No follow-ups on file.        Chief Complaint     Chief Complaint   Patient presents with    Physical Exam     Lily/ELIGIO       History of Present Illness     DISCUSSED HEALTH ISSUES  REVIEWED MEDICAL RECORD  NO CONCERNS AT THIS TIME            Well Adult Physical   Patient here for a comprehensive physical exam.      Diet and Physical Activity  Diet: well balanced diet  Weight concerns: Patient is overweight (BMI 25.0-29.9)  Exercise: frequently      Depression Screen  PHQ-2/9 Depression Screening    Little interest or pleasure in doing things: 0 - not at all  Feeling down, depressed, or hopeless: 0 - not at all  PHQ-2 Score: 0  PHQ-2 Interpretation: Negative depression screen          General Health  Hearing: Normal:  bilateral  Vision: no vision problems  Dental: regular dental visits    Reproductive Health          The following portions of the patient's history were reviewed and updated as appropriate: allergies, current medications, past family history, past medical history, past social history, past surgical history and problem list.    Review of Systems     Review of Systems   Constitutional:  Negative for chills, fatigue and fever.   HENT:  Negative for congestion, ear discharge, ear pain, mouth sores, postnasal drip, sore throat and trouble swallowing.    Eyes:   Negative for pain, discharge and visual disturbance.   Respiratory:  Negative for cough, shortness of breath and wheezing.    Cardiovascular:  Negative for chest pain, palpitations and leg swelling.   Gastrointestinal:  Negative for abdominal distention, abdominal pain, blood in stool, diarrhea and nausea.   Endocrine: Negative for polydipsia, polyphagia and polyuria.   Genitourinary:  Negative for dysuria, frequency, hematuria and urgency.   Musculoskeletal:  Negative for arthralgias, gait problem and joint swelling.   Skin:  Negative for pallor and rash.   Neurological:  Negative for dizziness, syncope, speech difficulty, weakness, light-headedness, numbness and headaches.   Hematological:  Negative for adenopathy.   Psychiatric/Behavioral:  Negative for behavioral problems, confusion and sleep disturbance. The patient is not nervous/anxious.        Past Medical History     Past Medical History:   Diagnosis Date    Achilles tendinitis of right lower extremity     Resolved 1/11/2018     Displacement of lumbar intervertebral disc without myelopathy     Hemorrhoids     Resolved 1/11/2018     Hyperactive labyrinth 5/23/2019       Past Surgical History     Past Surgical History:   Procedure Laterality Date    DENTAL SURGERY      MOHS SURGERY  06/2019    skin cancer - face       Social History     Social History     Socioeconomic History    Marital status: /Civil Union     Spouse name: None    Number of children: None    Years of education: None    Highest education level: None   Occupational History    None   Tobacco Use    Smoking status: Never     Passive exposure: Never    Smokeless tobacco: Never   Vaping Use    Vaping status: Never Used   Substance and Sexual Activity    Alcohol use: Yes     Comment: Occasional     Drug use: No    Sexual activity: None   Other Topics Concern    None   Social History Narrative    Caffeine use    Dental care, regularly    Drinks coffee      Social Determinants of Health  "    Financial Resource Strain: Not on file   Food Insecurity: Not on file   Transportation Needs: Not on file   Physical Activity: Not on file   Stress: Not on file   Social Connections: Not on file   Intimate Partner Violence: Not on file   Housing Stability: Not on file       Family History     Family History   Problem Relation Age of Onset    Hypertension Mother     Hypertension Father     Anxiety disorder Family     Substance Abuse Neg Hx     Mental illness Neg Hx        Current Medications       Current Outpatient Medications:     Sunscreens (Total Block SPF 60 Cover Up) LOTN, Total Block Cover Up SPF 60 lotion  Apply by topical route every 2 hours outdoors/after swimming, Disp: , Rfl:      Allergies     Allergies   Allergen Reactions    Penicillins        Objective     /90   Pulse 70   Temp 98.3 °F (36.8 °C) (Temporal)   Resp 16   Ht 5' 8\" (1.727 m)   Wt 81.5 kg (179 lb 9.6 oz)   SpO2 97%   BMI 27.31 kg/m²      Physical Exam  Constitutional:       General: He is not in acute distress.     Appearance: Normal appearance. He is well-developed and normal weight. He is not ill-appearing.   HENT:      Head: Normocephalic and atraumatic.      Right Ear: Tympanic membrane and external ear normal.      Left Ear: Tympanic membrane and external ear normal.      Nose: Nose normal.      Mouth/Throat:      Mouth: Mucous membranes are moist.   Eyes:      General:         Right eye: No discharge.         Left eye: No discharge.      Conjunctiva/sclera: Conjunctivae normal.      Pupils: Pupils are equal, round, and reactive to light.   Neck:      Thyroid: No thyromegaly.      Vascular: No JVD.   Cardiovascular:      Rate and Rhythm: Normal rate and regular rhythm.      Heart sounds: Normal heart sounds. No murmur heard.  Pulmonary:      Effort: Pulmonary effort is normal.      Breath sounds: Normal breath sounds. No wheezing or rales.   Abdominal:      General: Bowel sounds are normal.      Palpations: Abdomen is " soft. There is no mass.      Tenderness: There is no abdominal tenderness. There is no guarding or rebound.   Musculoskeletal:         General: No tenderness or deformity. Normal range of motion.      Cervical back: Neck supple.   Lymphadenopathy:      Cervical: No cervical adenopathy.   Skin:     General: Skin is warm and dry.      Findings: No erythema or rash.   Neurological:      General: No focal deficit present.      Mental Status: He is alert and oriented to person, place, and time.      Cranial Nerves: No cranial nerve deficit.      Sensory: No sensory deficit.      Motor: No weakness or abnormal muscle tone.      Coordination: Coordination normal.      Gait: Gait normal.      Deep Tendon Reflexes: Reflexes are normal and symmetric. Reflexes normal.   Psychiatric:         Mood and Affect: Mood normal.         Behavior: Behavior normal.         Thought Content: Thought content normal.         Judgment: Judgment normal.           No results found.    Health Maintenance     Health Maintenance   Topic Date Due    Annual Physical  Never done    Influenza Vaccine (1) Never done    COVID-19 Vaccine (2 - 2023-24 season) 09/01/2024    Depression Screening  10/24/2025    DTaP,Tdap,and Td Vaccines (2 - Td or Tdap) 05/27/2026    Zoster Vaccine (1 of 2) 07/13/2036    RSV Vaccine Age 60+ Years (1 - 1-dose 60+ series) 07/13/2046    HIV Screening  Completed    Hepatitis C Screening  Completed    RSV Vaccine age 0-20 Months  Aged Out    Pneumococcal Vaccine: Pediatrics (0 to 5 Years) and At-Risk Patients (6 to 64 Years)  Aged Out    HIB Vaccine  Aged Out    IPV Vaccine  Aged Out    Hepatitis A Vaccine  Aged Out    Meningococcal ACWY Vaccine  Aged Out    HPV Vaccine  Aged Out     Immunization History   Administered Date(s) Administered    COVID-19 MODERNA VACC 0.5 ML IM 11/09/2021    Tdap 05/27/2016       Cruz Hare MD  [facility]

## 2025-04-01 ENCOUNTER — TELEPHONE (OUTPATIENT)
Age: 40
End: 2025-04-01

## 2025-04-01 DIAGNOSIS — Z91.013 SHELLFISH ALLERGY: Primary | ICD-10-CM

## 2025-04-01 RX ORDER — EPINEPHRINE 0.3 MG/.3ML
0.3 INJECTION SUBCUTANEOUS ONCE
Qty: 0.6 ML | Refills: 0 | Status: SHIPPED | OUTPATIENT
Start: 2025-04-01 | End: 2025-04-01

## 2025-04-01 NOTE — TELEPHONE ENCOUNTER
PA for EPIPEN SUBMITTED to [employer]    via      [x][facility]-Case ID #     [x]PA sent as URGENT    All office notes, labs and other pertaining documents and studies sent. Clinical questions answered. Awaiting determination from insurance company.     Turnaround time for your insurance to make a decision on your Prior Authorization can take 7-21 business days.

## 2025-04-02 NOTE — TELEPHONE ENCOUNTER
PA for EPIPEN APPROVED     Date(s) approved     PHARMACY HAS RX ON ORDER    Patient advised by          [x]MyChart Message  []Phone call   []LMOM  []L/M to call office as no active Communication consent on file  []Unable to leave detailed message as VM not approved on Communication consent       Pharmacy advised by    [x]Fax  [x]Phone call  []Secure Chat